# Patient Record
Sex: MALE | Race: WHITE | NOT HISPANIC OR LATINO | URBAN - METROPOLITAN AREA
[De-identification: names, ages, dates, MRNs, and addresses within clinical notes are randomized per-mention and may not be internally consistent; named-entity substitution may affect disease eponyms.]

---

## 2017-08-22 ENCOUNTER — HOSPITAL ENCOUNTER (OUTPATIENT)
Dept: OTHER | Facility: HOSPITAL | Age: 37
Discharge: HOME | End: 2017-08-22
Attending: FAMILY MEDICINE | Admitting: FAMILY MEDICINE

## 2020-10-23 ENCOUNTER — TELEPHONE (OUTPATIENT)
Dept: FAMILY MEDICINE CLINIC | Facility: CLINIC | Age: 40
End: 2020-10-23

## 2020-10-23 RX ORDER — SULFAMETHOXAZOLE AND TRIMETHOPRIM 800; 160 MG/1; MG/1
1 TABLET ORAL 2 TIMES DAILY
Qty: 20 TABLET | Refills: 0 | Status: SHIPPED | OUTPATIENT
Start: 2020-10-23

## 2020-10-23 RX ORDER — MUPIROCIN CALCIUM 20 MG/G
CREAM TOPICAL 3 TIMES DAILY
Qty: 30 G | Refills: 0 | Status: SHIPPED | OUTPATIENT
Start: 2020-10-23 | End: 2020-11-06

## 2020-10-23 NOTE — TELEPHONE ENCOUNTER
Henry called today stating he has another griselda-anal abcess.  He said he doesn't have any health insurance and this is a recurring issue for him.  He said usually we do bactrim and mupirocin ointment and was wondering if you could send in another round of these medications for him to [facility] in [place]?  Please advise  Thanks.

## 2020-10-23 NOTE — TELEPHONE ENCOUNTER
Okay to send Bactrim DS 8 mg twice a day for 10 days, mupirocin ointment, if he does not improve should come in to be seen, thanks

## 2022-05-27 ENCOUNTER — OFFICE VISIT (OUTPATIENT)
Dept: FAMILY MEDICINE CLINIC | Facility: CLINIC | Age: 42
End: 2022-05-27

## 2022-05-27 ENCOUNTER — TELEPHONE (OUTPATIENT)
Dept: FAMILY MEDICINE CLINIC | Facility: CLINIC | Age: 42
End: 2022-05-27

## 2022-05-27 VITALS — TEMPERATURE: 98.1 F | HEIGHT: 71 IN | BODY MASS INDEX: 25.9 KG/M2 | WEIGHT: 185 LBS

## 2022-05-27 DIAGNOSIS — H92.09 EARACHE: Primary | ICD-10-CM

## 2022-05-27 DIAGNOSIS — Z72.0 TOBACCO USE: ICD-10-CM

## 2022-05-27 DIAGNOSIS — E66.3 OVERWEIGHT WITH BODY MASS INDEX (BMI) OF 25 TO 25.9 IN ADULT: ICD-10-CM

## 2022-05-27 PROBLEM — K21.00 GERD WITH ESOPHAGITIS: Status: ACTIVE | Noted: 2022-05-27

## 2022-05-27 PROBLEM — D23.30 BENIGN NEOPLASM OF SKIN OF FACE: Status: ACTIVE | Noted: 2022-05-27

## 2022-05-27 PROCEDURE — 99443 PR PHYS/QHP TELEPHONE EVALUATION 21-30 MIN: CPT | Performed by: FAMILY MEDICINE

## 2022-05-27 RX ORDER — CIPROFLOXACIN 500 MG/1
500 TABLET, FILM COATED ORAL 2 TIMES DAILY
Qty: 30 TABLET | Refills: 0 | Status: SHIPPED | OUTPATIENT
Start: 2022-05-27

## 2022-05-27 NOTE — TELEPHONE ENCOUNTER
Caller: Henry Morris Jr.    Relationship to patient: Self    Best call back number: [phone]    Patient is needing:PATIENT IS HAVING LEFT EAR PAIN AND WOULD LIKE TO KNOW IF YOU CAN CALL IN A PRESCRIPTION

## 2022-06-29 PROBLEM — E66.3 OVERWEIGHT WITH BODY MASS INDEX (BMI) OF 25 TO 25.9 IN ADULT: Status: ACTIVE | Noted: 2022-06-29

## 2022-06-29 NOTE — ASSESSMENT & PLAN NOTE
Patient's (Body mass index is 25.8 kg/m².) indicates that they are overweight with health conditions that include GERD . Weight is unchanged. BMI is is above average; BMI management plan is completed. We discussed portion control and increasing exercise.